# Patient Record
Sex: MALE | Race: WHITE | NOT HISPANIC OR LATINO
[De-identification: names, ages, dates, MRNs, and addresses within clinical notes are randomized per-mention and may not be internally consistent; named-entity substitution may affect disease eponyms.]

---

## 2021-01-31 ENCOUNTER — TRANSCRIPTION ENCOUNTER (OUTPATIENT)
Age: 26
End: 2021-01-31

## 2021-03-07 ENCOUNTER — TRANSCRIPTION ENCOUNTER (OUTPATIENT)
Age: 26
End: 2021-03-07

## 2022-03-13 ENCOUNTER — TRANSCRIPTION ENCOUNTER (OUTPATIENT)
Age: 27
End: 2022-03-13

## 2022-08-18 ENCOUNTER — EMERGENCY (EMERGENCY)
Facility: HOSPITAL | Age: 27
LOS: 0 days | Discharge: HOME | End: 2022-08-18
Attending: EMERGENCY MEDICINE | Admitting: EMERGENCY MEDICINE

## 2022-08-18 VITALS
OXYGEN SATURATION: 100 % | DIASTOLIC BLOOD PRESSURE: 100 MMHG | SYSTOLIC BLOOD PRESSURE: 163 MMHG | HEART RATE: 62 BPM | RESPIRATION RATE: 17 BRPM

## 2022-08-18 VITALS
SYSTOLIC BLOOD PRESSURE: 126 MMHG | RESPIRATION RATE: 18 BRPM | DIASTOLIC BLOOD PRESSURE: 76 MMHG | TEMPERATURE: 99 F | OXYGEN SATURATION: 100 % | HEART RATE: 70 BPM
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
## 2022-08-18 DIAGNOSIS — Z77.21 CONTACT WITH AND (SUSPECTED) EXPOSURE TO POTENTIALLY HAZARDOUS BODY FLUIDS: ICD-10-CM
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
## 2022-08-18 LAB
ALBUMIN SERPL ELPH-MCNC: 5 G/DL — SIGNIFICANT CHANGE UP (ref 3.5–5.2)
ALP SERPL-CCNC: 98 U/L — SIGNIFICANT CHANGE UP (ref 30–115)
ALT FLD-CCNC: 11 U/L — SIGNIFICANT CHANGE UP (ref 0–41)
ANION GAP SERPL CALC-SCNC: 12 MMOL/L — SIGNIFICANT CHANGE UP (ref 7–14)
AST SERPL-CCNC: 18 U/L — SIGNIFICANT CHANGE UP (ref 0–41)
BILIRUB DIRECT SERPL-MCNC: <0.2 MG/DL — SIGNIFICANT CHANGE UP (ref 0–0.3)
BILIRUB INDIRECT FLD-MCNC: >0.2 MG/DL — SIGNIFICANT CHANGE UP (ref 0.2–1.2)
BILIRUB SERPL-MCNC: 0.4 MG/DL — SIGNIFICANT CHANGE UP (ref 0.2–1.2)
BUN SERPL-MCNC: 19 MG/DL — SIGNIFICANT CHANGE UP (ref 10–20)
CALCIUM SERPL-MCNC: 9.8 MG/DL — SIGNIFICANT CHANGE UP (ref 8.5–10.1)
CHLORIDE SERPL-SCNC: 104 MMOL/L — SIGNIFICANT CHANGE UP (ref 98–110)
CO2 SERPL-SCNC: 26 MMOL/L — SIGNIFICANT CHANGE UP (ref 17–32)
CREAT SERPL-MCNC: 0.9 MG/DL — SIGNIFICANT CHANGE UP (ref 0.7–1.5)
EGFR: 121 ML/MIN/1.73M2 — SIGNIFICANT CHANGE UP
GLUCOSE SERPL-MCNC: 101 MG/DL — HIGH (ref 70–99)
HCT VFR BLD CALC: 42.9 % — SIGNIFICANT CHANGE UP (ref 42–52)
HCV AB S/CO SERPL IA: 0.04 COI — SIGNIFICANT CHANGE UP
HCV AB SERPL-IMP: SIGNIFICANT CHANGE UP
HGB BLD-MCNC: 15.3 G/DL — SIGNIFICANT CHANGE UP (ref 14–18)
MCHC RBC-ENTMCNC: 30.4 PG — SIGNIFICANT CHANGE UP (ref 27–31)
MCHC RBC-ENTMCNC: 35.7 G/DL — SIGNIFICANT CHANGE UP (ref 32–37)
MCV RBC AUTO: 85.1 FL — SIGNIFICANT CHANGE UP (ref 80–94)
NRBC # BLD: 0 /100 WBCS — SIGNIFICANT CHANGE UP (ref 0–0)
PLATELET # BLD AUTO: 221 K/UL — SIGNIFICANT CHANGE UP (ref 130–400)
POTASSIUM SERPL-MCNC: 4.3 MMOL/L — SIGNIFICANT CHANGE UP (ref 3.5–5)
POTASSIUM SERPL-SCNC: 4.3 MMOL/L — SIGNIFICANT CHANGE UP (ref 3.5–5)
PROT SERPL-MCNC: 7.3 G/DL — SIGNIFICANT CHANGE UP (ref 6–8)
RBC # BLD: 5.04 M/UL — SIGNIFICANT CHANGE UP (ref 4.7–6.1)
RBC # FLD: 12.3 % — SIGNIFICANT CHANGE UP (ref 11.5–14.5)
SODIUM SERPL-SCNC: 142 MMOL/L — SIGNIFICANT CHANGE UP (ref 135–146)
WBC # BLD: 7.92 K/UL — SIGNIFICANT CHANGE UP (ref 4.8–10.8)
WBC # FLD AUTO: 7.92 K/UL — SIGNIFICANT CHANGE UP (ref 4.8–10.8)

## 2022-08-18 PROCEDURE — 99284 EMERGENCY DEPT VISIT MOD MDM: CPT

## 2022-08-18 RX ORDER — RALTEGRAVIR 400 MG/1
1 TABLET, FILM COATED ORAL
Qty: 42 | Refills: 0
Start: 2022-08-18 | End: 2022-09-07

## 2022-08-18 RX ORDER — EMTRICITABINE AND TENOFOVIR DISOPROXIL FUMARATE 200; 300 MG/1; MG/1
1 TABLET, FILM COATED ORAL
Qty: 21 | Refills: 0
Start: 2022-08-18 | End: 2022-09-07

## 2022-08-18 NOTE — ED PROVIDER NOTE - CLINICAL SUMMARY MEDICAL DECISION MAKING FREE TEXT BOX
27-year-old male presented to ED after a fall with splashed in his eye.  Blood work was sent out pending source patient.  Patient will follow-up with [facility] home

## 2022-08-18 NOTE — ED PROVIDER NOTE - OBJECTIVE STATEMENT
27-year-old male no past medical history complains of body fluid exposure.  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  During washout bloody bodily fluid splashed into patient's left eye.  Patient emergently sent to ED for blood work.  Patient denies any complaints, left eye pain, conjunctival injection, shortness of breath or chest pain.

## 2022-08-18 NOTE — ED PROVIDER NOTE - NS_EDPROVIDERDISPOUSERTYPE_ED_A_ED
Administered High dose influenza vaccine to left deltoid with no adverse reactions.     
Attending Attestation (For Attendings USE Only)...

## 2022-08-18 NOTE — ED PROVIDER NOTE - PATIENT PORTAL LINK FT
You can access the FollowMyHealth Patient Portal offered by [facility] by registering at the following website: [email or web address]. By joining [facility]’s FollowMyHealth portal, you will also be able to view your health information using other applications (apps) compatible with our system.

## 2022-08-18 NOTE — ED PROVIDER NOTE - CARE PLAN
MEDICATIONS  (STANDING):  aspirin  chewable 81 milliGRAM(s) Oral daily  atorvastatin 80 milliGRAM(s) Oral at bedtime  cefTRIAXone   IVPB 1000 milliGRAM(s) IV Intermittent every 24 hours  clopidogrel Tablet 75 milliGRAM(s) Oral daily  enoxaparin Injectable 40 milliGRAM(s) SubCutaneous daily  midodrine. 10 milliGRAM(s) Oral three times a day  sodium chloride 0.9%. 1000 milliLiter(s) (125 mL/Hr) IV Continuous <Continuous> Principal Discharge DX:	Exposure to blood or body fluid   1

## 2022-08-18 NOTE — ED PROVIDER NOTE - PHYSICAL EXAMINATION
CONSTITUTIONAL: Well-appearing; well-nourished; in no apparent distress.   HEAD: Normocephalic; atraumatic.   EYES: PERRL; EOM intact. Conjunctiva normal B/L. eomi w/o pain; no conjunctival injection   ENT: patent oropharynx  NECK: Supple; non-tender;  CHEST: Normal chest excursion with respiration.   CARDIOVASCULAR: regular  RESPIRATORY: Normal chest excursion with respiration  GI/: non-distended  BACK: No evidence of trauma or deformity, no cva tenderness  EXT: Normal ROM in all four extremities   SKIN: Normal for age and race; warm; dry; good turgor.  NEURO: A & O x 4; CN 2-12 intact

## 2022-08-18 NOTE — ED PROVIDER NOTE - NSFOLLOWUPINSTRUCTIONS_ED_ALL_ED_FT
FOLLOW UP WITH EMPLOYEE [employer]                                                                                                  Preventing Body Fluid Exposure    Blood or body fluids such as urine, feces, semen, vaginal secretions, saliva, or breast milk may contain germs (bacteria or viruses) that can cause infections. The best way to prevent infection from these germs is to prevent body fluid exposure.    How can body fluid exposure affect me?    Germs can be spread when an infected person's body fluids come into contact with the mouth, nose, eyes, genitals, or broken skin of another person.      What can increase my risk?    You are more likely to be exposed to infected body fluids if you:  •Are a health care worker or family member who is taking care of a sick person.    •Use needles to inject drugs, and you share needles with other users.    •Have sex or engage in other sexual activities without using a condom or other protection    What actions can I take to prevent body fluid exposure?     •Wash and disinfect countertops and other surfaces regularly.    •Wear appropriate protective gear, such as gloves, gowns, masks, or eyewear, when the risk of exposure is present.    •Wipe away spills of body fluid with disposable towels and clean the area with a disinfectant.    •Properly dispose of blood products and other fluids. Use secured bags.    •Avoid recapping needles.    •Properly dispose of needles and other instruments with sharp points or edges (sharps). Use closed containers that are marked for sharps.    •Avoid injection drug use.    • Do not share needles.    •Use a condom during sex.    •Use small plastic sheets (dental dams) to cover your mouth, vagina, or anus to reduce the risk of HIV or other sexually transmitted infections during oral sex.    •Learn and follow any guidelines for preventing exposure (universal precautions) that are provided at your workplace.    What actions can I take to reduce my chances of getting an infection?     •Wash your hands frequently with soap and water. If soap and water are not available, use hand .    •Make sure your vaccines are up to date, including vaccines for tetanus and hepatitis.    •Avoid having multiple sexual partners.    •Consider pre-exposure prophylaxis (PrEP) for HIV if you:  •Are in an ongoing relationship with an HIV-positive partner.    •Have multiple sexual partners and engage in unprotected sex.    •Have sex with high-risk partners.    •Consider post-exposure prophylaxis for HIV with medicines (antiretrovirals) after unprotected sex. To be most effective, this must be started within 72 hours of a possible exposure.    What actions can I take to avoid spreading infection to others?    •Keep open wounds covered.    •Dispose of any items with blood on them by putting them in the trash. This includes razors, tampons, and bandages.    • Do not share personal items such as toothbrushes, razors, or dental floss.    • Do not share drug supplies with others. These include needles, syringes, straws, and pipes.    •Follow all instructions from your health care provider for preventing the spread of infection.    Where to find more information    The National [employer] Safety and Health (NIOSH): www.cdc.gov    Summary    •The best way to prevent infection from germs that are spread by blood or body fluids is to prevent body fluid exposure.    •Wear appropriate protective gear, such as gloves, gowns, masks, or eyewear, when the risk of exposure is present.    •Wash your hands frequently with soap and water. If soap and water are not available, use hand .    •Follow all instructions from your health care provider for preventing the spread of infection.    This information is not intended to replace advice given to you by your health care provider. Make sure you discuss any questions you have with your health care provider.

## 2022-08-18 NOTE — ED PROVIDER NOTE - ATTENDING CONTRIBUTION TO CARE
27-year-old male with no past medical history presents to ED after he got blood in his eye.  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  Patient washed his eye out afterwards does not have any blurry vision or pain.  No other injuries.  Patient was told to come to the emergency department for blood work.  The source patient is being tested at this time.    Const: NAD  Eyes: PERRL, no conjunctival injection  HENT:  Neck supple without meningismus   CV: RRR, Warm, well-perfused extremities  RESP: CTA B/L, no tachypnea   MSK: No gross deformities appreciated  Skin: Warm, dry. No rashes    will do blood work

## 2022-08-19 LAB
HBV SURFACE AB SER-ACNC: REACTIVE
HBV SURFACE AG SER-ACNC: SIGNIFICANT CHANGE UP
HIV 1+2 AB+HIV1 P24 AG SERPL QL IA: SIGNIFICANT CHANGE UP

## 2023-01-20 NOTE — ED ADULT NURSE NOTE - NSFALLRSKASSESSTYPE_ED_ALL_ED
Pt comes in with immense back pain post lifting up a heavy object at work. Pt c/o pain in low back plus weakness and tingling in LLE. Imaging reveals L4L5 loss of disc height and herniation of left side. also herniationof interval between L5S1
Initial (On Arrival)

## 2023-05-15 NOTE — ED ADULT NURSE NOTE - CAS DISCH ACCOMP BY
RN called patient to relay results. Pt agreeable. Pt is scheduled to see Dr. Reyes 06/19/2023   Self

## 2023-05-17 PROBLEM — Z78.9 OTHER SPECIFIED HEALTH STATUS: Chronic | Status: ACTIVE | Noted: 2022-08-18

## 2023-05-17 PROBLEM — Z00.00 ENCOUNTER FOR PREVENTIVE HEALTH EXAMINATION: Status: ACTIVE | Noted: 2023-05-17

## 2023-05-19 DIAGNOSIS — Z78.9 OTHER SPECIFIED HEALTH STATUS: ICD-10-CM

## 2023-05-19 NOTE — DATA REVIEWED
[de-identified] : Patient: MANE MORALES\par YOB: 1995\par Phone: [phone]\par MRN: [id number] Acc: [id number]\par Date of Exam: 04-\par  \par EXAM: MRI CERVICAL SPINE WITHOUT CONTRAST.\par \par HISTORY: Neck pain.\par \par TECHNIQUE: Multiplanar, multi sequential MRI of the cervical spine was obtained on a 1.5T scanner using a standard protocol.\par \par COMPARISON:  None available.\par \par FINDINGS:\par \par Bone: No acute fracture. Vertebral body heights are preserved. No areas of bone destruction or marrow replacement. \par \par Alignment: Mild reversal of the expected cervical lordosis. No listhesis.\par \par Disc spaces, spinal canal, and neural foramina: Disc desiccation throughout the levels of C2-3 through C6-7. Mild disc height loss at C4-5 and C5-6. \par Evaluation of the individual motion segments demonstrates the following:\par \par --C2-3 level:   Moderate left uncovertebral hypertrophy and mild left facet arthropathy. Moderate left foraminal stenosis. No canal or right foraminal narrowing.\par \par --C3-4 level:  No disc herniation, canal narrowing, or foraminal narrowing.\par \par --C4-5 level:  Mild annular bulge and superimposed left paracentral/subarticular zone disc extrusion, which is migrated both superiorly and inferiorly along the posterior endplates. There is an annular fissure within the posterior disc. Disc extrusion generates mild left cord compression. No cord signal abnormality. Moderate canal stenosis eccentric towards the left. Mild-moderate left foraminal narrowing. No right foraminal narrowing.\par \par --C5-6 level:  Mild disc bulge and superimposed left paracentral disc protrusion, with moderate compression of the left cord. No cord signal abnormality. Moderate canal stenosis. Mild-moderate bilateral foraminal narrowing.\par \par --C6-7 level:  No disc herniation, canal narrowing, or foraminal narrowing.\par \par --C7-T1 level:  No disc herniation, canal narrowing, or foraminal narrowing.\par \par Spinal cord and skull base:  The cervical spinal cord is normal in signal. The cervicomedullary junction is unremarkable. \par \par Paraspinal/Prevertebral soft tissues: Unremarkable.\par \par IMPRESSION: MRI of the cervical spine demonstrates:\par \par Multilevel cervical spondylosis, with cord compression at C4-5 and C5-6. No cord signal abnormalities.\par 1.  At C2-3: Moderate left uncovertebral hypertrophy and mild left facet arthropathy. Moderate left foraminal stenosis.\par 2.  At C4-5: Mild annular bulge and left paracentral/subarticular-zone disc extrusion. Annular fissure within the posterior disc. Mild left cord compression. Moderate canal stenosis. Mild-moderate left foraminal narrowing.\par 3.  At C5-6: Mild disc bulge and left paracentral disc protrusion. Moderate left cord compression. Moderate canal stenosis. Mild-moderate bilateral foraminal narrowing.\par 4.  Nonspecific mild reversal of expected cervical lordosis, which may be related to spasm.\par \par Thank you for the opportunity to participate in the care of this patient.  \par  \par Amina Stevenson MD  - Electronically Signed: 04- 4:00 PM \par Physician to Physician Direct Line is: [phone]

## 2023-05-23 ENCOUNTER — APPOINTMENT (OUTPATIENT)
Dept: SPINE | Facility: CLINIC | Age: 28
End: 2023-05-23

## 2025-07-23 ENCOUNTER — APPOINTMENT (OUTPATIENT)
Dept: PHYSICAL MEDICINE AND REHAB | Facility: CLINIC | Age: 30
End: 2025-07-23
Payer: COMMERCIAL

## 2025-07-23 VITALS
OXYGEN SATURATION: 98 % | DIASTOLIC BLOOD PRESSURE: 79 MMHG | BODY MASS INDEX: 23.62 KG/M2 | WEIGHT: 165 LBS | HEIGHT: 70 IN | SYSTOLIC BLOOD PRESSURE: 136 MMHG | HEART RATE: 70 BPM

## 2025-07-23 DIAGNOSIS — R53.83 OTHER FATIGUE: ICD-10-CM

## 2025-07-23 DIAGNOSIS — Z86.16 PERSONAL HISTORY OF COVID-19: ICD-10-CM

## 2025-07-23 DIAGNOSIS — Z72.820 SLEEP DEPRIVATION: ICD-10-CM

## 2025-07-23 DIAGNOSIS — R29.898 OTHER SYMPTOMS AND SIGNS INVOLVING THE MUSCULOSKELETAL SYSTEM: ICD-10-CM

## 2025-07-23 DIAGNOSIS — R45.89 OTHER SYMPTOMS AND SIGNS INVOLVING EMOTIONAL STATE: ICD-10-CM

## 2025-07-23 DIAGNOSIS — Z98.1 ARTHRODESIS STATUS: ICD-10-CM

## 2025-07-23 DIAGNOSIS — Z00.00 ENCOUNTER FOR GENERAL ADULT MEDICAL EXAMINATION W/OUT ABNORMAL FINDINGS: ICD-10-CM

## 2025-07-23 DIAGNOSIS — R41.89 OTHER SYMPTOMS AND SIGNS INVOLVING COGNITIVE FUNCTIONS AND AWARENESS: ICD-10-CM

## 2025-07-23 PROCEDURE — 99205 OFFICE O/P NEW HI 60 MIN: CPT

## 2025-08-19 ENCOUNTER — APPOINTMENT (OUTPATIENT)
Dept: PULMONOLOGY | Facility: CLINIC | Age: 30
End: 2025-08-19
Payer: COMMERCIAL

## 2025-08-19 DIAGNOSIS — Z72.820 SLEEP DEPRIVATION: ICD-10-CM

## 2025-08-19 DIAGNOSIS — G47.10 HYPERSOMNIA, UNSPECIFIED: ICD-10-CM

## 2025-08-19 DIAGNOSIS — R41.89 OTHER SYMPTOMS AND SIGNS INVOLVING COGNITIVE FUNCTIONS AND AWARENESS: ICD-10-CM

## 2025-08-19 PROCEDURE — 99204 OFFICE O/P NEW MOD 45 MIN: CPT | Mod: 95

## 2025-08-19 PROCEDURE — G2211 COMPLEX E/M VISIT ADD ON: CPT | Mod: 95

## 2025-08-20 PROBLEM — G47.10 HYPERSOMNOLENCE: Status: ACTIVE | Noted: 2025-08-20
